# Patient Record
Sex: FEMALE | Race: WHITE
[De-identification: names, ages, dates, MRNs, and addresses within clinical notes are randomized per-mention and may not be internally consistent; named-entity substitution may affect disease eponyms.]

---

## 2017-04-13 ENCOUNTER — HOSPITAL ENCOUNTER (OUTPATIENT)
Dept: HOSPITAL 45 - C.MRI | Age: 57
End: 2017-04-13
Attending: PHYSICIAN ASSISTANT
Payer: COMMERCIAL

## 2017-04-13 DIAGNOSIS — R93.8: ICD-10-CM

## 2017-04-13 DIAGNOSIS — R51: ICD-10-CM

## 2017-04-13 DIAGNOSIS — M62.81: Primary | ICD-10-CM

## 2017-04-13 LAB
ALBUMIN/GLOB SERPL: 1 {RATIO} (ref 0.9–2)
ALP SERPL-CCNC: 92 U/L (ref 45–117)
ALT SERPL-CCNC: 18 U/L (ref 12–78)
ANION GAP SERPL CALC-SCNC: 4 MMOL/L (ref 3–11)
AST SERPL-CCNC: 15 U/L (ref 15–37)
BASOPHILS # BLD: 0.04 K/UL (ref 0–0.2)
BASOPHILS NFR BLD: 0.4 %
BUN SERPL-MCNC: 15 MG/DL (ref 7–18)
BUN/CREAT SERPL: 22.4 (ref 10–20)
CALCIUM SERPL-MCNC: 8.6 MG/DL (ref 8.5–10.1)
CHLORIDE SERPL-SCNC: 112 MMOL/L (ref 98–107)
CO2 SERPL-SCNC: 26 MMOL/L (ref 21–32)
COMPLETE: YES
CREAT SERPL-MCNC: 0.66 MG/DL (ref 0.6–1.2)
EOSINOPHIL NFR BLD AUTO: 270 K/UL (ref 130–400)
GLOBULIN SER-MCNC: 3.4 GM/DL (ref 2.5–4)
GLUCOSE SERPL-MCNC: 100 MG/DL (ref 70–99)
HCT VFR BLD CALC: 39.3 % (ref 37–47)
IG%: 0.4 %
IMM GRANULOCYTES NFR BLD AUTO: 40 %
LYMPHOCYTES # BLD: 3.9 K/UL (ref 1.2–3.4)
MCH RBC QN AUTO: 31.6 PG (ref 25–34)
MCHC RBC AUTO-ENTMCNC: 34.9 G/DL (ref 32–36)
MCV RBC AUTO: 90.6 FL (ref 80–100)
MONOCYTES NFR BLD: 9.4 %
NEUTROPHILS # BLD AUTO: 0.7 %
NEUTROPHILS NFR BLD AUTO: 49.1 %
PMV BLD AUTO: 10.3 FL (ref 7.4–10.4)
POTASSIUM SERPL-SCNC: 3.9 MMOL/L (ref 3.5–5.1)
RBC # BLD AUTO: 4.34 M/UL (ref 4.2–5.4)
RHEUMATOID FACT FLD-ACNC: < 10 U/ML (ref 0–15)
SODIUM SERPL-SCNC: 142 MMOL/L (ref 136–145)
TSH SERPL-ACNC: < 0.005 UIU/ML (ref 0.3–4.5)
WBC # BLD AUTO: 9.74 K/UL (ref 4.8–10.8)

## 2017-07-12 ENCOUNTER — HOSPITAL ENCOUNTER (OUTPATIENT)
Dept: HOSPITAL 45 - C.MRIBC | Age: 57
Discharge: HOME | End: 2017-07-12
Attending: PSYCHIATRY & NEUROLOGY
Payer: COMMERCIAL

## 2017-07-12 DIAGNOSIS — M54.12: ICD-10-CM

## 2017-07-12 DIAGNOSIS — M12.88: ICD-10-CM

## 2017-07-12 DIAGNOSIS — M48.02: ICD-10-CM

## 2017-07-12 DIAGNOSIS — M50.00: Primary | ICD-10-CM

## 2017-07-12 DIAGNOSIS — Z98.1: ICD-10-CM
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
## 2018-07-23 NOTE — PAT MEDICATION INSTRUCTIONS
Service Date


Jul 23, 2018.





Current Home Medication List


Amphetamine-Dextroamphetamine 10MG (Adderall 10MG), 10 MG PO QPM


Amphetamine-Dextroamphetamine 20MG (Adderall 20MG), 20 MG PO QAM


Baclofen (Lioresal), 20 MG PO QID


Cyanocobalamin (Vitamin B12), 1,000 MCG PO QAM


Ergocalciferol (Vitamin D 36551 Unit), 50,000 UNIT PO 2XWEEK


Fluoxetine (Prozac), 60 MG PO QAM


Gabapentin (Neurontin), 800 MG PO QID


Ibuprofen Tab (Advil), 400 MG PO QAM


Ibuprofen-Diphenhydramine Citr (Advil Pm), 2 TAB PO QPM


Lamotrigine (Lamictal), 100 MG PO HS


Levothyroxine Sodium (Levothyroxine Sodium), 1 TAB PO QAM


Melatonin (Melatonin Maximum Strengt), 10 MG PO HS


Montelukast Sodium (Montelukast Sodium), 1 TAB PO HS


Penicillin V Potassium (Veetids), 500 MG PO Q6H


Trazodone Hcl (Trazodone), 100 MG PO HS


[Ibrandonate], 1 DOSE PO MONTHLY


[Nystatin], 1 DOSE PO PRN





Medication Instructions


For Your Scheduled Surgery 





-Continue as directed:


Ergocalciferol (Vitamin D 77013 Unit), 50,000 UNIT PO 2XWEEK (do not take AM 

day of surgery)


Penicillin V Potassium (Veetids), 500 MG PO Q6H


[Ibrandonate], 1 DOSE PO MONTHLY








-Instructions per surgeon:


Ibuprofen Tab (Advil), 400 MG PO QAM


Ibuprofen-Diphenhydramine Citr (Advil Pm), 2 TAB PO QPM








- Hold the following medications 24 hours prior to surgery:


[Nystatin], 1 DOSE PO PRN








- Hold the following medications the morning of surgery:


Amphetamine-Dextroamphetamine 20MG (Adderall 20MG), 20 MG PO QAM


Baclofen (Lioresal), 20 MG PO QID


Cyanocobalamin (Vitamin B12), 1,000 MCG PO QAM








- Take the following medications the morning of surgery with a sip of water:


Fluoxetine (Prozac), 60 MG PO QAM


Gabapentin (Neurontin), 800 MG PO QID


Levothyroxine Sodium (Levothyroxine Sodium), 1 TAB PO QAM








- Take the following medications as scheduled the night before surgery:


Amphetamine-Dextroamphetamine 10MG (Adderall 10MG), 10 MG PO QPM


Baclofen (Lioresal), 20 MG PO QID


Gabapentin (Neurontin), 800 MG PO QID


Lamotrigine (Lamictal), 100 MG PO HS


Melatonin (Melatonin Maximum Strengt), 10 MG PO HS


Montelukast Sodium (Montelukast Sodium), 1 TAB PO HS


Trazodone Hcl (Trazodone), 100 MG PO HS








If you have any questions please call us at [phone] or [phone] or 

[phone]

## 2018-07-24 ENCOUNTER — HOSPITAL ENCOUNTER (OUTPATIENT)
Dept: HOSPITAL 45 - C.CPL | Age: 58
Discharge: HOME | End: 2018-07-24
Attending: PHYSICIAN ASSISTANT
Payer: COMMERCIAL

## 2018-07-24 DIAGNOSIS — Z01.818: Primary | ICD-10-CM

## 2018-07-24 DIAGNOSIS — N62: ICD-10-CM

## 2018-07-24 LAB
BASOPHILS # BLD: 0.05 K/UL (ref 0–0.2)
BASOPHILS NFR BLD: 0.8 %
BUN SERPL-MCNC: 13 MG/DL (ref 7–18)
CALCIUM SERPL-MCNC: 8.7 MG/DL (ref 8.5–10.1)
CO2 SERPL-SCNC: 28 MMOL/L (ref 21–32)
CREAT SERPL-MCNC: 0.76 MG/DL (ref 0.6–1.2)
EOS ABS #: 0.09 K/UL (ref 0–0.5)
EOSINOPHIL NFR BLD AUTO: 258 K/UL (ref 130–400)
GLUCOSE SERPL-MCNC: 84 MG/DL (ref 70–99)
HCT VFR BLD CALC: 37.6 % (ref 37–47)
HGB BLD-MCNC: 12.4 G/DL (ref 12–16)
IG#: 0.01 K/UL (ref 0–0.02)
IMM GRANULOCYTES NFR BLD AUTO: 35.8 %
INR PPP: 1 (ref 0.9–1.1)
LYMPHOCYTES # BLD: 2.12 K/UL (ref 1.2–3.4)
MCH RBC QN AUTO: 30.3 PG (ref 25–34)
MCHC RBC AUTO-ENTMCNC: 33 G/DL (ref 32–36)
MCV RBC AUTO: 91.9 FL (ref 80–100)
MONO ABS #: 0.5 K/UL (ref 0.11–0.59)
MONOCYTES NFR BLD: 8.4 %
NEUT ABS #: 3.15 K/UL (ref 1.4–6.5)
NEUTROPHILS # BLD AUTO: 1.5 %
NEUTROPHILS NFR BLD AUTO: 53.3 %
PMV BLD AUTO: 10.1 FL (ref 7.4–10.4)
POTASSIUM SERPL-SCNC: 3.6 MMOL/L (ref 3.5–5.1)
PTT PATIENT: 23.9 SECONDS (ref 21–31)
RED CELL DISTRIBUTION WIDTH CV: 13.4 % (ref 11.5–14.5)
RED CELL DISTRIBUTION WIDTH SD: 44.9 FL (ref 36.4–46.3)
SODIUM SERPL-SCNC: 141 MMOL/L (ref 136–145)
WBC # BLD AUTO: 5.92 K/UL (ref 4.8–10.8)

## 2018-08-06 ENCOUNTER — HOSPITAL ENCOUNTER (OUTPATIENT)
Dept: HOSPITAL 45 - C.ACU | Age: 58
Setting detail: OBSERVATION
LOS: 1 days | Discharge: HOME | End: 2018-08-07
Attending: SURGERY | Admitting: SURGERY
Payer: COMMERCIAL

## 2018-08-06 VITALS
HEIGHT: 68 IN | BODY MASS INDEX: 23.82 KG/M2 | WEIGHT: 157.19 LBS | BODY MASS INDEX: 23.82 KG/M2 | BODY MASS INDEX: 23.82 KG/M2 | HEIGHT: 68 IN | WEIGHT: 157.19 LBS

## 2018-08-06 VITALS — OXYGEN SATURATION: 94 %

## 2018-08-06 VITALS — DIASTOLIC BLOOD PRESSURE: 56 MMHG | HEART RATE: 72 BPM | SYSTOLIC BLOOD PRESSURE: 92 MMHG

## 2018-08-06 VITALS
SYSTOLIC BLOOD PRESSURE: 78 MMHG | HEART RATE: 72 BPM | DIASTOLIC BLOOD PRESSURE: 42 MMHG | OXYGEN SATURATION: 98 % | TEMPERATURE: 97.34 F

## 2018-08-06 VITALS
TEMPERATURE: 97.52 F | HEART RATE: 66 BPM | SYSTOLIC BLOOD PRESSURE: 77 MMHG | OXYGEN SATURATION: 96 % | DIASTOLIC BLOOD PRESSURE: 49 MMHG

## 2018-08-06 VITALS — OXYGEN SATURATION: 99 % | DIASTOLIC BLOOD PRESSURE: 45 MMHG | HEART RATE: 74 BPM | SYSTOLIC BLOOD PRESSURE: 80 MMHG

## 2018-08-06 VITALS
TEMPERATURE: 97.52 F | DIASTOLIC BLOOD PRESSURE: 53 MMHG | HEART RATE: 71 BPM | SYSTOLIC BLOOD PRESSURE: 92 MMHG | OXYGEN SATURATION: 94 %

## 2018-08-06 VITALS
DIASTOLIC BLOOD PRESSURE: 59 MMHG | SYSTOLIC BLOOD PRESSURE: 94 MMHG | OXYGEN SATURATION: 91 % | TEMPERATURE: 97.52 F | HEART RATE: 73 BPM

## 2018-08-06 VITALS
OXYGEN SATURATION: 99 % | HEART RATE: 61 BPM | SYSTOLIC BLOOD PRESSURE: 91 MMHG | DIASTOLIC BLOOD PRESSURE: 58 MMHG | TEMPERATURE: 97.52 F

## 2018-08-06 VITALS
DIASTOLIC BLOOD PRESSURE: 67 MMHG | SYSTOLIC BLOOD PRESSURE: 121 MMHG | OXYGEN SATURATION: 95 % | HEART RATE: 63 BPM | TEMPERATURE: 97.88 F

## 2018-08-06 VITALS
SYSTOLIC BLOOD PRESSURE: 98 MMHG | TEMPERATURE: 97.7 F | DIASTOLIC BLOOD PRESSURE: 63 MMHG | HEART RATE: 80 BPM | OXYGEN SATURATION: 94 %

## 2018-08-06 VITALS — DIASTOLIC BLOOD PRESSURE: 50 MMHG | HEART RATE: 71 BPM | SYSTOLIC BLOOD PRESSURE: 93 MMHG

## 2018-08-06 VITALS — DIASTOLIC BLOOD PRESSURE: 52 MMHG | SYSTOLIC BLOOD PRESSURE: 91 MMHG | HEART RATE: 72 BPM

## 2018-08-06 VITALS — DIASTOLIC BLOOD PRESSURE: 48 MMHG | SYSTOLIC BLOOD PRESSURE: 82 MMHG

## 2018-08-06 DIAGNOSIS — M19.90: ICD-10-CM

## 2018-08-06 DIAGNOSIS — M79.7: ICD-10-CM

## 2018-08-06 DIAGNOSIS — N62: Primary | ICD-10-CM

## 2018-08-06 DIAGNOSIS — F32.9: ICD-10-CM

## 2018-08-06 DIAGNOSIS — E03.9: ICD-10-CM

## 2018-08-06 DIAGNOSIS — F41.9: ICD-10-CM

## 2018-08-06 DIAGNOSIS — Z87.891: ICD-10-CM

## 2018-08-06 DIAGNOSIS — Z88.5: ICD-10-CM

## 2018-08-06 DIAGNOSIS — F90.9: ICD-10-CM

## 2018-08-06 RX ADMIN — PENICILLIN V POTASIUM SCH MG: 250 TABLET ORAL at 23:44

## 2018-08-06 RX ADMIN — ALUMINUM ZIRCONIUM TRICHLOROHYDREX GLY SCH EA: 0.2 STICK TOPICAL at 15:19

## 2018-08-06 RX ADMIN — BACLOFEN SCH MG: 20 TABLET ORAL at 20:51

## 2018-08-06 RX ADMIN — HYDROMORPHONE HYDROCHLORIDE PRN MG: 1 INJECTION, SOLUTION INTRAMUSCULAR; INTRAVENOUS; SUBCUTANEOUS at 21:05

## 2018-08-06 RX ADMIN — HYDROMORPHONE HYDROCHLORIDE PRN MG: 1 INJECTION, SOLUTION INTRAMUSCULAR; INTRAVENOUS; SUBCUTANEOUS at 14:33

## 2018-08-06 RX ADMIN — DEXTROSE MONOHYDRATE, SODIUM CHLORIDE, AND POTASSIUM CHLORIDE SCH MLS/HR: 50; 4.5; 1.49 INJECTION, SOLUTION INTRAVENOUS at 23:45

## 2018-08-06 RX ADMIN — ALUMINUM ZIRCONIUM TRICHLOROHYDREX GLY SCH EA: 0.2 STICK TOPICAL at 23:46

## 2018-08-06 RX ADMIN — DEXTROSE MONOHYDRATE, SODIUM CHLORIDE, AND POTASSIUM CHLORIDE SCH MLS/HR: 50; 4.5; 1.49 INJECTION, SOLUTION INTRAVENOUS at 15:18

## 2018-08-06 RX ADMIN — ACETAMINOPHEN PRN MG: 325 TABLET ORAL at 13:38

## 2018-08-06 RX ADMIN — PROMETHAZINE HYDROCHLORIDE PRN MLS/HR: 25 INJECTION INTRAMUSCULAR; INTRAVENOUS at 23:46

## 2018-08-06 RX ADMIN — GABAPENTIN SCH MG: 800 TABLET, FILM COATED ORAL at 16:22

## 2018-08-06 RX ADMIN — HYDROMORPHONE HYDROCHLORIDE PRN MG: 1 INJECTION, SOLUTION INTRAMUSCULAR; INTRAVENOUS; SUBCUTANEOUS at 23:56

## 2018-08-06 RX ADMIN — PENICILLIN V POTASIUM SCH MG: 250 TABLET ORAL at 18:33

## 2018-08-06 RX ADMIN — ACETAMINOPHEN PRN MG: 325 TABLET ORAL at 19:30

## 2018-08-06 RX ADMIN — BACLOFEN SCH MG: 20 TABLET ORAL at 16:22

## 2018-08-06 RX ADMIN — GABAPENTIN SCH MG: 800 TABLET, FILM COATED ORAL at 20:49

## 2018-08-06 NOTE — [facility] POST OPERATIVE BRIEF NOTE
Immediate Operative Summary


Operative Date


Aug 6, 2018.





Pre-Operative Diagnosis





Bilateral Breast Hypertrophy





Post-Operative Diagnosis





Bilateral Breast Hypertrophy





Procedure(s) Performed





Bilateral Breast Reduction





Surgeon


Dr. Myrna Mcguire





Assistant Surgeon(s)


None





Estimated Blood Loss


25ML





Findings


Consistent with Post-Op Diagnosis





Specimens





Fresh Specimen:





B.) Right Breast Tissue (590grams) - sent out of OR6 to lab by OR Maureen Waller at 1045.





Drains


JPx2





Anesthesia Type


General





Complication(s)


none





Disposition


Disposition:  Recovery Room / PACU

## 2018-08-06 NOTE — PROGRESS NOTE
Progress Note


Date of Service


Aug 6, 2018.





Progress Note


Post op check





contacted by RN that patient's BP low.  Feels fine, pain controlled.  Just 

received Dilaudid


78/42, HR 72


drains serosanguinous


breasts soft, no hematoma


sensation intact bilateral NACs


both nipples pink centrally, right slightly pale around areola.


right areola pricked with 22 gauge needle, bright red bleeding





observe NACs; suspect right is pale due to residual effect of local


LR bolus ordered for low BP, suspect related to pain meds as no hematomas, no 

tachycardia

## 2018-08-06 NOTE — ANESTHESIOLOGY PROGRESS NOTE
Anesthesia Post Op Note


Date & Time


Aug 6, 2018 at 12:36





Vital Signs


Pain Intensity:  3





Vital Signs Past 12 Hours








  Date Time  Temp Pulse Resp B/P (MAP) Pulse Ox O2 Delivery O2 Flow Rate FiO2


 


8/6/18 12:25  65 16 112/53 100 Nasal Cannula 3 


 


8/6/18 12:15  65 16 127/55 100 Oxymask 10 


 


8/6/18 12:05  66 16 122/65 100 Oxymask 10 


 


8/6/18 11:55  66 16 118/64 100 Oxymask 10 


 


8/6/18 11:49 36.3 68 14 125/66 96 Oxymask 10 


 


8/6/18 06:00 36.6 63 18 121/67 (85) 95 Room Air  











Notes


Mental Status:  alert / awake / arousable, participated in evaluation


Pt Amnestic to Procedure:  Yes


Nausea / Vomiting:  adequately controlled


Pain:  adequately controlled


Airway Patency, RR, SpO2:  stable & adequate


BP & HR:  stable & adequate


Hydration State:  stable & adequate


Anesthetic Complications:  no major complications apparent

## 2018-08-07 VITALS
HEART RATE: 74 BPM | OXYGEN SATURATION: 96 % | TEMPERATURE: 97.88 F | DIASTOLIC BLOOD PRESSURE: 67 MMHG | SYSTOLIC BLOOD PRESSURE: 121 MMHG

## 2018-08-07 VITALS
SYSTOLIC BLOOD PRESSURE: 89 MMHG | HEART RATE: 86 BPM | OXYGEN SATURATION: 91 % | DIASTOLIC BLOOD PRESSURE: 55 MMHG | TEMPERATURE: 97.88 F

## 2018-08-07 VITALS — OXYGEN SATURATION: 96 %

## 2018-08-07 VITALS
OXYGEN SATURATION: 96 % | HEART RATE: 74 BPM | TEMPERATURE: 97.88 F | SYSTOLIC BLOOD PRESSURE: 121 MMHG | DIASTOLIC BLOOD PRESSURE: 67 MMHG

## 2018-08-07 RX ADMIN — HYDROMORPHONE HYDROCHLORIDE PRN MG: 1 INJECTION, SOLUTION INTRAMUSCULAR; INTRAVENOUS; SUBCUTANEOUS at 04:57

## 2018-08-07 RX ADMIN — PROMETHAZINE HYDROCHLORIDE PRN MLS/HR: 25 INJECTION INTRAMUSCULAR; INTRAVENOUS at 05:58

## 2018-08-07 RX ADMIN — HYDROMORPHONE HYDROCHLORIDE PRN MG: 1 INJECTION, SOLUTION INTRAMUSCULAR; INTRAVENOUS; SUBCUTANEOUS at 08:22

## 2018-08-07 RX ADMIN — BACLOFEN SCH MG: 20 TABLET ORAL at 08:38

## 2018-08-07 RX ADMIN — DEXTROSE MONOHYDRATE, SODIUM CHLORIDE, AND POTASSIUM CHLORIDE SCH MLS/HR: 50; 4.5; 1.49 INJECTION, SOLUTION INTRAVENOUS at 08:18

## 2018-08-07 RX ADMIN — PENICILLIN V POTASIUM SCH MG: 250 TABLET ORAL at 05:51

## 2018-08-07 RX ADMIN — ALUMINUM ZIRCONIUM TRICHLOROHYDREX GLY SCH EA: 0.2 STICK TOPICAL at 08:00

## 2018-08-07 RX ADMIN — GABAPENTIN SCH MG: 800 TABLET, FILM COATED ORAL at 08:38

## 2018-08-07 NOTE — DISCHARGE INSTRUCTIONS
Discharge Instructions


Date of Service


Aug 7, 2018.





Admission


Reason for Admission:  Symptomatic Macromastia #872





Discharge


Discharge Diagnosis / Problem:  large breasts





Discharge Goals


Goal(s):  Decrease discomfort, Improve function





Activity Recommendations


Activity Limitations:  as noted below





.





Instructions / Follow-Up


Instructions / Follow-Up


ACTIVITY RECOMMENDATIONS:





__Normal activities





_x_No bending, lifting or straining





__No driving





_x_Driving allowed when you are off pain medications





_x_Walking permitted





__You should have help at home for ___ days





DRESSINGS:





__No dressings required





__Keep dressings dry/in place until first office visit





__Remove dressings ___ and leave dressings off





__Apply ice ___ days





_x_Remove dressings and reapply garment tomorrow.  OK to shower tomorrow.





__Apply antibiotic ointment (Bacitracin, Neosporin, etc) to wounds 3-4 times/

day for 10 days





BATHING:





__Keep dressings dry





__Sponge bathing permitted





_x_Showering permitted Wednesday





_x_No swimming, hot tubs or soaking in a tub





MEDICATIONS:





Resume previous medications unless instructed otherwise by your surgeon.





__Do not use aspirin, Motrin, Advil or Ibuprofen as these may promote bleeding.

  Please


   use Tylenol.





_x_Prescription(s) provided: in office








OTHER INSTRUCTIONS:





__Record drain output 2-3 times per day








SPECIAL CARE INSTRUCTIONS:





*  It is normal to have a mild fever after surgery.  If your temperature is 

higher than 


   101.5 degrees F, please call the office at [phone].





*  Constipation is a typical side effect of pain medication.  An over-the-

counter stool softener


   will help relieve this.





*  Leaking around surgical drains may occur and should not cause concern.  

Sometimes 


   these drains become clogged.  If this happens, remove the bulb and milk the 

clot out of 


   the tube, then replace the bulb.





*  Drainage from wounds after liposuction is normal and should be expected.  

Garments will 


   become soiled.  You should protect furniture and bedding.  This drainage 

should mostly 


   subside within 2-3 days.  Leave garments in place unless instructed to 

remove them.





*  If you have unusual drainage from a wound or are concerned you have an 

infection or have 


   any questions or concerns, please call the office at [phone].








FOLLOW UP VISIT:





If not already scheduled, please call the office, [phone], when you return 

home after surgery 


to schedule an appointment to be seen in __14_ days.








Current Hospital Diet


Patient's current hospital diet: Regular Diet





Discharge Diet


Recommended Diet:  Regular Diet





Procedures


Procedures Performed:  


Bilateral Breast Reduction





Pending Studies


Studies pending at discharge:  no





Medical Emergencies








.


Who to Call and When:





Medical Emergencies:  If at any time you feel your situation is an emergency, 

please call 911 immediately.





.





Non-Emergent Contact


Non-Emergency issues call your:  Primary Care Provider


.








"Provider Documentation" section prepared by Myrna Mcguire.








.

## 2018-08-07 NOTE — PROGRESS NOTE
Progress Note


Date of Service


Aug 7, 2018.





Progress Note


POD #1


Doing well.  Pain mostly controlled


89/55 86


Drains 30/25 cc serosanguinous


bilateral NACs pink and viable with sensation


no hematomas





Discharge home today


Dressings changed


Follow up in 2 weeks for suture removal


Instructions given

## 2018-08-08 NOTE — DISCHARGE SUMMARY
Discharge Summary


Date of Service


Aug 7, 2018.





Discharge Summary


Admission Date:


Aug 6, 2018 at 11:50


Discharge Date:  Aug 7, 2018


Primary Diagnosis:


bilateral symptomatic macromastia


Secondary Diagnoses/Problems:


Medical Problems:


(1) Hypothyroidism


Status: Chronic  





Surgical Problems:


(1) H/O: hysterectomy


Status: Resolved  





(2) History of 


Status: Resolved  





(3) S/P cervical spinal fusion


Status: Resolved  








Procedures:


Bilateral reduction mammoplasty performed 2018


Consultations:


none


Pending Studies/Follow-Up:


none





Discharge Instructions


Last Recorded Wt (Kilograms):  71.300


Activity Recommendations:  limitations as noted below


Diet At Discharge:  Regular


Allergies:  


Coded Allergies:  


     Celecoxib (Verified  Allergy, Unknown, Rash, 18)


     Isoetharine (Verified  Allergy, Unknown, ANAPHYLAXIS, 18)


     Meperidine (Verified  Allergy, Unknown, RASH, 18)


     Morphine (Verified  Allergy, Unknown, Rash,Resp distress, 18)


     Trimethobenzamide (Verified  Allergy, Unknown, DELIRIUM, 18)


 HALLUCINATIONS


Discharge Medications:


Resume all home meds except for ibuprofen.


Home Health Services:  none


Special Care:





Call your doctor if:





*  Temperature above 101 degrees


*  Pain not relieved by pain medicine ordered


*  There is increased drainage or redness from any incision


*  You have any unanswered questions or concerns.








Avoid all tobacco products. If you need help to stop smoking, call


[place]'s FREE QUITLINE at 1-320.610.6432.  This is a free call.





Admission Information


Admission HPI:


Patient admitted after undergoing elective breast reduction surgery.





Hospital Course


Mild asymptomatic hypotension postoperatively, resolved with fluid bolus.  

Discharged home POD #1.  NACs pink and viable throughout hospital course.

## 2018-08-08 NOTE — OPERATIVE REPORT
DATE OF OPERATION:  08/06/2018

 

PREOPERATIVE DIAGNOSIS:  Bilateral symptomatic macromastia.

 

POSTOPERATIVE DIAGNOSIS:  Bilateral symptomatic macromastia.

 

PROCEDURE:  Bilateral reduction mammoplasty.

 

SURGEON:  Myrna Mcguire MD

 

ASSISTANT:  None.

 

ANESTHESIA:  General.

 

COMPLICATIONS:  None.

 

INDICATION FOR THE PROCEDURE:  The patient is a 58-year-old female who

presented to my office with complaints of back, neck and shoulder pain as

well as chronic Lyme disease.  Back, neck and shoulder pain was felt to be

related to macromastia.  After discussion, she elected to undergo breast

reduction surgery.

 

BRIEF DESCRIPTION OF THE PROCEDURE:  The risks, benefits and alternatives of

the procedure were explained to the patient who agreed and signed consent. 

She was identified and marked in the preoperative holding area.  She was

brought to the operating room where she was positioned supine and placed

under general anesthesia without incident.  Surgical site was prepped and

draped sterilely.  A time-out procedure was performed.  I began with the left

side.  Markings were reassessed and an 8 cm pedicle was marked.  Lidocaine 1%

with epinephrine was used to anesthetize the planned incisions.  A 38 mm

cookie cutter was used to circumscribe the nipple-areolar complex. 

Previously marked 8 cm pedicle was incised using a 15 blade scalpel and

deepithelialized.  I began with medial dissection of the pedicle using

electrocautery.  Electrocautery was used to incise through dermis and breast

parenchyma down to chest wall, taking care not to undermine the pedicle

during dissection.  A similar procedure was undertaken on the lateral aspect

of the pedicle, taking care not to undermine.  Lastly, the pedicle was

dissected out superiorly using electrocautery and this was carried down to

the chest wall as well.  I then began excision of medial breast tissue

followed by lateral aspect of the breast tissue and the surrounding keyhole

incision.  A 15 blade scalpel was used to deepen the inframammary fold

incision and electrocautery was used to deepen the incision through dermis

and breast parenchyma.  Dissection was then carried superiorly to the level

of the superior incision.  Superior incision was then incised using 15 blade

scalpel and then again dissected using electrocautery.  This was undertaken

laterally and then around the keyhole portion of the incision.  Care was

taken to leave some fat on the lateral pectoralis fascia in order protect the

T4 intercostal nerve.  Hemostasis was achieved with electrocautery.  Specimen

was removed in its entirety and passed off for weighing.  Additional

resection under the flap was undertaken.  This was performed until a total of

714 grams removed from the left breast.  This was the larger of the 2

breasts.  The wound was irrigated with saline and hemostasis was achieved

with electrocautery.  0.25% Marcaine plain was used to anesthetize the

incisions as well as pectoralis fascia.  A 15 Romanian Giuseppe drain was brought

out through a separate stab incision.  The nipple-areolar complex was brought

into the keyhole using 2-0 Vicryl deep dermal suture.  The wound was then

closed in a lateral to mid breast direction using 2-0 Vicryl deep dermals and

then medial to mid breast using 2-0 Vicryl deep dermals.  The vertical limb

was also approximated using 2-0 Vicryl deep dermals.  Nipple areolar complex

was inset using 2-0 Vicryl deep dermals.  Next, the superficial dermal layer

was closed using 2-0 PDO running Quill suture along the inframammary fold and

3-0 PDS interrupted dermal sutures for the vertical limb and nipple-areolar

complex.  Lastly, 3-0 Monocryl running subcuticular suture was placed.  A

similar procedure was undertaken on the smaller right side with maximal

excision weight of 590 grams.  The nipple-areolar complexes were pink and

viable throughout the entire procedure.  The breasts were very symmetric

following the procedure.  Following closure, Dermabond Prineo was applied

along the inframammary fold and vertical limb incisions and Dermabond was

placed around the nipple-areolar complex.  Dry dressings and a surgical bra

were placed.  The patient was awakened and transferred to the recovery room

in satisfactory condition.

 

 

I attest to the content of the Intraoperative Record and any orders documented therein. Any exception
s are noted below.